# Patient Record
Sex: MALE | Race: WHITE | URBAN - NONMETROPOLITAN AREA
[De-identification: names, ages, dates, MRNs, and addresses within clinical notes are randomized per-mention and may not be internally consistent; named-entity substitution may affect disease eponyms.]

---

## 2021-10-21 ENCOUNTER — OFFICE VISIT (OUTPATIENT)
Dept: URBAN - NONMETROPOLITAN AREA CLINIC 7 | Facility: CLINIC | Age: 21
End: 2021-10-21
Payer: COMMERCIAL

## 2021-10-21 PROCEDURE — 99204 OFFICE O/P NEW MOD 45 MIN: CPT | Performed by: OPTOMETRIST

## 2021-10-21 RX ORDER — TOBRAMYCIN AND DEXAMETHASONE 3; 1 MG/ML; MG/ML
SUSPENSION/ DROPS OPHTHALMIC
Qty: 5 | Refills: 1 | Status: INACTIVE
Start: 2021-10-21 | End: 2021-11-18

## 2021-10-21 RX ORDER — NEOMYCIN SULFATE, POLYMYXIN B SULFATE AND DEXAMETHASONE 3.5; 10000; 1 MG/G; [USP'U]/G; MG/G
OINTMENT OPHTHALMIC
Qty: 3.5 | Refills: 1 | Status: INACTIVE
Start: 2021-10-21 | End: 2021-11-18

## 2021-10-21 RX ORDER — CEPHALEXIN 250 MG/1
250 MG CAPSULE ORAL
Qty: 56 | Refills: 0 | Status: INACTIVE
Start: 2021-10-21 | End: 2021-11-18

## 2021-10-21 ASSESSMENT — INTRAOCULAR PRESSURE
OS: 15
OD: 14

## 2021-10-21 NOTE — IMPRESSION/PLAN
Impression: Hordeolum of eyelid: H00.019. Bilateral. Plan: Prescribed Keflex 250 Mg PO QID x 14 days. Tobradex QID OU, Maxitrol Ointment, pt instructed to start warm compresses 15 min stretches. Start Oil fish up to 2000. eRx given today.

## 2021-11-18 ENCOUNTER — OFFICE VISIT (OUTPATIENT)
Dept: URBAN - NONMETROPOLITAN AREA CLINIC 7 | Facility: CLINIC | Age: 21
End: 2021-11-18
Payer: COMMERCIAL

## 2021-11-18 DIAGNOSIS — H00.019 HORDEOLUM OF EYELID: Primary | ICD-10-CM

## 2021-11-18 PROCEDURE — 99213 OFFICE O/P EST LOW 20 MIN: CPT | Performed by: OPTOMETRIST

## 2021-11-18 RX ORDER — AZITHROMYCIN MONOHYDRATE 250 MG/1
250 MG TABLET, FILM COATED ORAL
Qty: 6 | Refills: 0 | Status: INACTIVE
Start: 2021-11-18 | End: 2021-11-22

## 2021-11-18 RX ORDER — NEOMYCIN SULFATE, POLYMYXIN B SULFATE AND DEXAMETHASONE 3.5; 10000; 1 MG/G; [USP'U]/G; MG/G
OINTMENT OPHTHALMIC
Qty: 3.5 | Refills: 0 | Status: ACTIVE
Start: 2021-11-18

## 2021-11-18 RX ORDER — TOBRAMYCIN AND DEXAMETHASONE 3; 1 MG/ML; MG/ML
SUSPENSION/ DROPS OPHTHALMIC
Qty: 5 | Refills: 0 | Status: ACTIVE
Start: 2021-11-18

## 2021-11-18 ASSESSMENT — INTRAOCULAR PRESSURE
OS: 15
OD: 15

## 2021-11-18 NOTE — IMPRESSION/PLAN
Impression: Hordeolum of eyelid: H00.019. Bilateral. Plan: Z-Pack x 1 week. Restart: Tobradex QID OU, Restart: Maxitrol Ointment, pt instructed to restart warm compresses. Cont. Oil fish up to 2000. eRx given today.

## 2021-12-02 ENCOUNTER — OFFICE VISIT (OUTPATIENT)
Dept: URBAN - NONMETROPOLITAN AREA CLINIC 7 | Facility: CLINIC | Age: 21
End: 2021-12-02
Payer: COMMERCIAL

## 2021-12-02 PROCEDURE — 99214 OFFICE O/P EST MOD 30 MIN: CPT | Performed by: OPTOMETRIST

## 2021-12-02 ASSESSMENT — INTRAOCULAR PRESSURE
OS: 15
OD: 14

## 2021-12-02 NOTE — IMPRESSION/PLAN
Impression: Hordeolum of eyelid: H00.019. Bilateral. Plan: No improvement with systemic or topical Tx. Refer to oculoplastics. Finished Z-Pack. CPM: Tobradex QID OU, Maxitrol Ointment. pt instructed to restart warm compresses. Cont. Oil fish up to 2000.